# Patient Record
Sex: FEMALE | Employment: FULL TIME | URBAN - METROPOLITAN AREA
[De-identification: names, ages, dates, MRNs, and addresses within clinical notes are randomized per-mention and may not be internally consistent; named-entity substitution may affect disease eponyms.]

---

## 2018-11-01 ENCOUNTER — APPOINTMENT (OUTPATIENT)
Dept: GENERAL RADIOLOGY | Age: 58
End: 2018-11-01
Attending: EMERGENCY MEDICINE
Payer: COMMERCIAL

## 2018-11-01 ENCOUNTER — APPOINTMENT (OUTPATIENT)
Dept: CT IMAGING | Age: 58
End: 2018-11-01
Attending: EMERGENCY MEDICINE
Payer: COMMERCIAL

## 2018-11-01 ENCOUNTER — APPOINTMENT (OUTPATIENT)
Dept: MRI IMAGING | Age: 58
End: 2018-11-01
Attending: HOSPITALIST
Payer: COMMERCIAL

## 2018-11-01 ENCOUNTER — HOSPITAL ENCOUNTER (OUTPATIENT)
Age: 58
Setting detail: OBSERVATION
Discharge: HOME | End: 2018-11-02
Attending: EMERGENCY MEDICINE | Admitting: HOSPITALIST
Payer: COMMERCIAL

## 2018-11-01 DIAGNOSIS — R51.9 NONINTRACTABLE HEADACHE, UNSPECIFIED CHRONICITY PATTERN, UNSPECIFIED HEADACHE TYPE: ICD-10-CM

## 2018-11-01 DIAGNOSIS — I63.9 CEREBROVASCULAR ACCIDENT (CVA), UNSPECIFIED MECHANISM (HCC): Primary | ICD-10-CM

## 2018-11-01 DIAGNOSIS — H53.9 VISION DISTURBANCE: ICD-10-CM

## 2018-11-01 LAB
ALBUMIN SERPL-MCNC: 4 G/DL (ref 3.5–5)
ALBUMIN/GLOB SERPL: 1.3 {RATIO} (ref 1.1–2.2)
ALP SERPL-CCNC: 50 U/L (ref 45–117)
ALT SERPL-CCNC: 29 U/L (ref 12–78)
ANION GAP SERPL CALC-SCNC: 7 MMOL/L (ref 5–15)
APTT PPP: 30.5 SEC (ref 22.1–32)
AST SERPL-CCNC: 18 U/L (ref 15–37)
BASOPHILS # BLD: 0.1 K/UL (ref 0–0.1)
BASOPHILS NFR BLD: 1 % (ref 0–1)
BILIRUB SERPL-MCNC: 0.5 MG/DL (ref 0.2–1)
BUN SERPL-MCNC: 18 MG/DL (ref 6–20)
BUN/CREAT SERPL: 20 (ref 12–20)
CALCIUM SERPL-MCNC: 9.2 MG/DL (ref 8.5–10.1)
CHLORIDE SERPL-SCNC: 105 MMOL/L (ref 97–108)
CO2 SERPL-SCNC: 26 MMOL/L (ref 21–32)
COMMENT, HOLDF: NORMAL
CREAT SERPL-MCNC: 0.92 MG/DL (ref 0.55–1.02)
DIFFERENTIAL METHOD BLD: NORMAL
EOSINOPHIL # BLD: 0.1 K/UL (ref 0–0.4)
EOSINOPHIL NFR BLD: 1 % (ref 0–7)
ERYTHROCYTE [DISTWIDTH] IN BLOOD BY AUTOMATED COUNT: 13.2 % (ref 11.5–14.5)
ERYTHROCYTE [SEDIMENTATION RATE] IN BLOOD: 2 MM/HR (ref 0–30)
GLOBULIN SER CALC-MCNC: 3.1 G/DL (ref 2–4)
GLUCOSE SERPL-MCNC: 88 MG/DL (ref 65–100)
HCT VFR BLD AUTO: 45.7 % (ref 35–47)
HGB BLD-MCNC: 15.3 G/DL (ref 11.5–16)
IMM GRANULOCYTES # BLD: 0 K/UL (ref 0–0.04)
IMM GRANULOCYTES NFR BLD AUTO: 0 % (ref 0–0.5)
INR PPP: 1.1 (ref 0.9–1.1)
LYMPHOCYTES # BLD: 2.5 K/UL (ref 0.8–3.5)
LYMPHOCYTES NFR BLD: 30 % (ref 12–49)
MCH RBC QN AUTO: 30.1 PG (ref 26–34)
MCHC RBC AUTO-ENTMCNC: 33.5 G/DL (ref 30–36.5)
MCV RBC AUTO: 89.8 FL (ref 80–99)
MONOCYTES # BLD: 0.6 K/UL (ref 0–1)
MONOCYTES NFR BLD: 7 % (ref 5–13)
NEUTS SEG # BLD: 5.2 K/UL (ref 1.8–8)
NEUTS SEG NFR BLD: 61 % (ref 32–75)
NRBC # BLD: 0 K/UL (ref 0–0.01)
NRBC BLD-RTO: 0 PER 100 WBC
PLATELET # BLD AUTO: 205 K/UL (ref 150–400)
PMV BLD AUTO: 9.9 FL (ref 8.9–12.9)
POTASSIUM SERPL-SCNC: 3.5 MMOL/L (ref 3.5–5.1)
PROT SERPL-MCNC: 7.1 G/DL (ref 6.4–8.2)
PROTHROMBIN TIME: 10.8 SEC (ref 9–11.1)
RBC # BLD AUTO: 5.09 M/UL (ref 3.8–5.2)
SAMPLES BEING HELD,HOLD: NORMAL
SODIUM SERPL-SCNC: 138 MMOL/L (ref 136–145)
THERAPEUTIC RANGE,PTTT: NORMAL SECS (ref 58–77)
TROPONIN I SERPL-MCNC: <0.05 NG/ML
WBC # BLD AUTO: 8.5 K/UL (ref 3.6–11)

## 2018-11-01 PROCEDURE — 85652 RBC SED RATE AUTOMATED: CPT | Performed by: EMERGENCY MEDICINE

## 2018-11-01 PROCEDURE — 74011250636 HC RX REV CODE- 250/636: Performed by: HOSPITALIST

## 2018-11-01 PROCEDURE — 84484 ASSAY OF TROPONIN QUANT: CPT | Performed by: EMERGENCY MEDICINE

## 2018-11-01 PROCEDURE — 99218 HC RM OBSERVATION: CPT

## 2018-11-01 PROCEDURE — A9575 INJ GADOTERATE MEGLUMI 0.1ML: HCPCS | Performed by: HOSPITALIST

## 2018-11-01 PROCEDURE — 85730 THROMBOPLASTIN TIME PARTIAL: CPT | Performed by: EMERGENCY MEDICINE

## 2018-11-01 PROCEDURE — 96374 THER/PROPH/DIAG INJ IV PUSH: CPT

## 2018-11-01 PROCEDURE — 85025 COMPLETE CBC W/AUTO DIFF WBC: CPT | Performed by: EMERGENCY MEDICINE

## 2018-11-01 PROCEDURE — 80053 COMPREHEN METABOLIC PANEL: CPT | Performed by: EMERGENCY MEDICINE

## 2018-11-01 PROCEDURE — 74011250637 HC RX REV CODE- 250/637: Performed by: HOSPITALIST

## 2018-11-01 PROCEDURE — 36415 COLL VENOUS BLD VENIPUNCTURE: CPT | Performed by: EMERGENCY MEDICINE

## 2018-11-01 PROCEDURE — 99285 EMERGENCY DEPT VISIT HI MDM: CPT

## 2018-11-01 PROCEDURE — 80061 LIPID PANEL: CPT | Performed by: HOSPITALIST

## 2018-11-01 PROCEDURE — 70450 CT HEAD/BRAIN W/O DYE: CPT

## 2018-11-01 PROCEDURE — 83036 HEMOGLOBIN GLYCOSYLATED A1C: CPT | Performed by: HOSPITALIST

## 2018-11-01 PROCEDURE — 71045 X-RAY EXAM CHEST 1 VIEW: CPT

## 2018-11-01 PROCEDURE — 70553 MRI BRAIN STEM W/O & W/DYE: CPT

## 2018-11-01 PROCEDURE — 93005 ELECTROCARDIOGRAM TRACING: CPT

## 2018-11-01 PROCEDURE — 85610 PROTHROMBIN TIME: CPT | Performed by: EMERGENCY MEDICINE

## 2018-11-01 PROCEDURE — 96376 TX/PRO/DX INJ SAME DRUG ADON: CPT

## 2018-11-01 RX ORDER — BUPROPION HYDROCHLORIDE 150 MG/1
300 TABLET, EXTENDED RELEASE ORAL DAILY
Status: DISCONTINUED | OUTPATIENT
Start: 2018-11-02 | End: 2018-11-02 | Stop reason: HOSPADM

## 2018-11-01 RX ORDER — ACETAMINOPHEN 325 MG/1
650 TABLET ORAL
Status: DISCONTINUED | OUTPATIENT
Start: 2018-11-01 | End: 2018-11-02 | Stop reason: HOSPADM

## 2018-11-01 RX ORDER — LORAZEPAM 1 MG/1
1 TABLET ORAL ONCE
Status: COMPLETED | OUTPATIENT
Start: 2018-11-01 | End: 2018-11-01

## 2018-11-01 RX ORDER — GADOTERATE MEGLUMINE 376.9 MG/ML
15 INJECTION INTRAVENOUS
Status: COMPLETED | OUTPATIENT
Start: 2018-11-01 | End: 2018-11-01

## 2018-11-01 RX ORDER — LORAZEPAM 0.5 MG/1
0.5 TABLET ORAL
Status: DISCONTINUED | OUTPATIENT
Start: 2018-11-01 | End: 2018-11-02 | Stop reason: HOSPADM

## 2018-11-01 RX ORDER — ALBUTEROL SULFATE 0.83 MG/ML
2.5 SOLUTION RESPIRATORY (INHALATION)
Status: DISCONTINUED | OUTPATIENT
Start: 2018-11-01 | End: 2018-11-02 | Stop reason: HOSPADM

## 2018-11-01 RX ORDER — GUAIFENESIN 100 MG/5ML
81 LIQUID (ML) ORAL DAILY
Status: DISCONTINUED | OUTPATIENT
Start: 2018-11-02 | End: 2018-11-02 | Stop reason: HOSPADM

## 2018-11-01 RX ORDER — FAMOTIDINE 20 MG/1
20 TABLET, FILM COATED ORAL EVERY 12 HOURS
Status: DISCONTINUED | OUTPATIENT
Start: 2018-11-01 | End: 2018-11-02 | Stop reason: HOSPADM

## 2018-11-01 RX ORDER — SODIUM CHLORIDE 0.9 % (FLUSH) 0.9 %
5-10 SYRINGE (ML) INJECTION EVERY 8 HOURS
Status: DISCONTINUED | OUTPATIENT
Start: 2018-11-01 | End: 2018-11-02 | Stop reason: HOSPADM

## 2018-11-01 RX ORDER — ACETAMINOPHEN 650 MG/1
650 SUPPOSITORY RECTAL
Status: DISCONTINUED | OUTPATIENT
Start: 2018-11-01 | End: 2018-11-02 | Stop reason: HOSPADM

## 2018-11-01 RX ORDER — SODIUM CHLORIDE 0.9 % (FLUSH) 0.9 %
5-10 SYRINGE (ML) INJECTION AS NEEDED
Status: DISCONTINUED | OUTPATIENT
Start: 2018-11-01 | End: 2018-11-02 | Stop reason: HOSPADM

## 2018-11-01 RX ORDER — KETOROLAC TROMETHAMINE 30 MG/ML
30 INJECTION, SOLUTION INTRAMUSCULAR; INTRAVENOUS
Status: DISCONTINUED | OUTPATIENT
Start: 2018-11-01 | End: 2018-11-02 | Stop reason: HOSPADM

## 2018-11-01 RX ADMIN — KETOROLAC TROMETHAMINE 30 MG: 30 INJECTION, SOLUTION INTRAMUSCULAR at 20:22

## 2018-11-01 RX ADMIN — GADOTERATE MEGLUMINE 15 ML: 376.9 INJECTION INTRAVENOUS at 22:07

## 2018-11-01 RX ADMIN — LORAZEPAM 1 MG: 1 TABLET ORAL at 21:26

## 2018-11-01 NOTE — ED TRIAGE NOTES
Pt arrives from home with complaints of abnormal vision in both eyes with LEFT sided headache. Vision changes occurred last night and have no resolved. Pt is reporting \"floaters\" in field of vision.

## 2018-11-01 NOTE — ED PROVIDER NOTES
62 y.o. female with no significant past medical history who presents from work with chief complaint of vision changes. Pt states that she drove in town from [place] last night for work and once at her hotel, she noticed \"something moving around\" in her left field of vision. She states that it has been constant since that time. 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 She denies any h/o migraines or hx of similar symptoms. She reports a h/o anxiety. Pt denies any CP, SOB, or focal weakness. There are no other acute medical concerns at this time. Note written by Lico Sears, as dictated by Jamar Souza MD 6:26 PM 
 
 
The history is provided by the patient. No  was used. No past medical history on file. No past surgical history on file. No family history on file. Social History Socioeconomic History  Marital status: Not on file Spouse name: Not on file  Number of children: Not on file  Years of education: Not on file  Highest education level: Not on file Social Needs  Financial resource strain: Not on file  Food insecurity - worry: Not on file  Food insecurity - inability: Not on file  Transportation needs - medical: Not on file  Transportation needs - non-medical: Not on file Occupational History  Not on file Tobacco Use  Smoking status: Not on file Substance and Sexual Activity  Alcohol use: Not on file  Drug use: Not on file  Sexual activity: Not on file Other Topics Concern  Not on file Social History Narrative  Not on file ALLERGIES: Dilaudid [hydromorphone]; Morphine; and Reglan [metoclopramide hcl] Review of Systems Constitutional: Negative for fever. Eyes: Positive for visual disturbance. Respiratory: Negative for shortness of breath. Cardiovascular: Negative for chest pain. Gastrointestinal: Negative for abdominal pain. Neurological: Positive for headaches. All other systems reviewed and are negative. Vitals:  
 11/01/18 1823 11/01/18 1845 11/01/18 1900 11/01/18 1915 BP: 170/90 154/81 141/84 153/82 Pulse: 73 70 69 Resp: 13 18 Temp: 99.2 °F (37.3 °C) SpO2: 100% 99% 98% 98% Physical Exam  
Constitutional: She is oriented to person, place, and time. She appears well-developed and well-nourished. No distress. HENT:  
Head: Normocephalic and atraumatic. Eyes: Conjunctivae are normal. No scleral icterus. Neck: Neck supple. No tracheal deviation present. Cardiovascular: Normal rate, regular rhythm, normal heart sounds and intact distal pulses. Exam reveals no gallop and no friction rub. No murmur heard. Pulmonary/Chest: Effort normal and breath sounds normal. She has no wheezes. She has no rales. Abdominal: Soft. She exhibits no distension. There is no tenderness. There is no rebound and no guarding. Musculoskeletal: She exhibits no edema. Neurological: She is alert and oriented to person, place, and time. VAN negative. No pronator drift. No decreased  strength. No visual field cut. No aphasia. No neglect. Skin: Skin is warm and dry. No rash noted. Psychiatric: She has a normal mood and affect. Nursing note and vitals reviewed. Note written by Lico Blackman, as dictated by Dann Camacho MD 6:26 PM 
 
MDM Procedures CONSULT NOTE: 
6:28 PM Dann Camacho MD spoke with Dr. Omar Giron, Consult for Tele-Neurology. Discussed available diagnostic tests and clinical findings. Dr. Omar Giron recommends admission to r/o MS or possible stroke. Recommends getting an MRI. ED EKG interpretation: 
Rhythm: normal sinus rhythm; and irregular. Rate (approx.): 62.  
Note written by Lico Blackman, as dictated by Dann Camacho MD 6:41 PM 
 
PROGRESS NOTE: 
 7:51 PM 
Updated pt on results. She agrees with plan for admission. CONSULT NOTE: 
7:53 PM Brendan Reeder MD communicated with Dr. Jami Bashir for Hospitalist via [facility]. Discussed available diagnostic tests and clinical findings. Dr. Sanaz Franco will admit the pt. 
 
7:54 PM 
Patient is being admitted to the hospital.  The results of their tests and reasons for their admission have been discussed with them and/or available family. They convey agreement and understanding for the need to be admitted and for their admission diagnosis. Consultation will be made now with the inpatient physician for hospitalization.

## 2018-11-02 VITALS
WEIGHT: 153.44 LBS | HEIGHT: 65 IN | DIASTOLIC BLOOD PRESSURE: 81 MMHG | OXYGEN SATURATION: 100 % | BODY MASS INDEX: 25.56 KG/M2 | SYSTOLIC BLOOD PRESSURE: 144 MMHG | TEMPERATURE: 98.7 F | RESPIRATION RATE: 15 BRPM | HEART RATE: 45 BPM

## 2018-11-02 LAB
CHOLEST SERPL-MCNC: 288 MG/DL
EST. AVERAGE GLUCOSE BLD GHB EST-MCNC: 103 MG/DL
HBA1C MFR BLD: 5.2 % (ref 4.2–6.3)
HDLC SERPL-MCNC: 83 MG/DL
HDLC SERPL: 3.5 {RATIO} (ref 0–5)
LDLC SERPL CALC-MCNC: 192 MG/DL (ref 0–100)
LIPID PROFILE,FLP: ABNORMAL
TRIGL SERPL-MCNC: 65 MG/DL (ref ?–150)
VLDLC SERPL CALC-MCNC: 13 MG/DL

## 2018-11-02 PROCEDURE — 99218 HC RM OBSERVATION: CPT

## 2018-11-02 PROCEDURE — 96376 TX/PRO/DX INJ SAME DRUG ADON: CPT

## 2018-11-02 PROCEDURE — 74011250636 HC RX REV CODE- 250/636: Performed by: HOSPITALIST

## 2018-11-02 PROCEDURE — 74011250637 HC RX REV CODE- 250/637: Performed by: HOSPITALIST

## 2018-11-02 RX ORDER — GUAIFENESIN 100 MG/5ML
81 LIQUID (ML) ORAL DAILY
COMMUNITY

## 2018-11-02 RX ORDER — BUPROPION HYDROCHLORIDE 300 MG/1
300 TABLET ORAL
COMMUNITY

## 2018-11-02 RX ADMIN — KETOROLAC TROMETHAMINE 30 MG: 30 INJECTION, SOLUTION INTRAMUSCULAR at 10:24

## 2018-11-02 RX ADMIN — BUPROPION HYDROCHLORIDE 300 MG: 150 TABLET, EXTENDED RELEASE ORAL at 08:36

## 2018-11-02 RX ADMIN — ASPIRIN 81 MG CHEWABLE TABLET 81 MG: 81 TABLET CHEWABLE at 08:36

## 2018-11-02 RX ADMIN — ACETAMINOPHEN 650 MG: 325 TABLET ORAL at 04:12

## 2018-11-02 RX ADMIN — FAMOTIDINE 20 MG: 20 TABLET ORAL at 08:36

## 2018-11-02 RX ADMIN — Medication 10 ML: at 07:16

## 2018-11-02 RX ADMIN — ACETAMINOPHEN 650 MG: 325 TABLET ORAL at 08:36

## 2018-11-02 NOTE — PROGRESS NOTES
Patient being discharged today, with an eye appointment at [phone]. Patient from out of town, but okay to drive self to eye appointment per provider.

## 2018-11-02 NOTE — DISCHARGE SUMMARY
Discharge Summary     PATIENT ID: Gildardo Herzog  MRN: [id number]   YOB: 1960    DATE OF ADMISSION: 11/1/2018  6:06 PM    DATE OF DISCHARGE: 11/2/2018  PRIMARY CARE PROVIDER: Columba Eagle MD  ATTENDING PHYSICIAN: Huma Stone MD  DISCHARGING PROVIDER: Eder Celis NP. To contact this individual call [phone] and ask the  to page. If unavailable ask to be transferred the Adult Hospitalist Department. CONSULTATIONS: IP CONSULT TO HOSPITALIST  IP CONSULT TO NEUROLOGY    ADMITTING DIAGNOSES & HOSPITAL COURSE:   Pt presented to the ED with left eye vision disturbance and left sided headache. 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 This has been persistent and has been seeing \"strings\" or \"floaters\" with a headache some pressure around her left eye. + mild nausea, no vomiting and a dark room makes her feel better. Reported a hx of anxiety. Denied CP, SOB, or focal weakness, no other sensation changes,slurred speech, gait disturbance or vertigo.      DISCHARGE DIAGNOSES / PLAN:      Acute vision distubance:   - has been persistent - feels as though she sees strings in left peripheral vision + mild pressure and mild pain  - MRI negative  - Check A1c 5.2  - FLP elevated Chol 288 Ldl 192 Hdl 83.  Recommend follow up outpatient for fasting study  - Held echo or carotid doppler - these are not needed   - has outpatient appt scheduled at 1:20 with Dr. Erasto Zambrano at [facility]    Mild intermittent asthma: prn albuterol     Anxiety: continue wellbutrin     Headache: tylenol outpatient     FOLLOW UP APPOINTMENTS:    Follow-up Information     Follow up With Specialties Details Why Contact Info    Addison Ramos MD Family Practice  As needed [address] [facility], Dr. Erasto Zambrano Today 1:20 [address], [address] [facility], [address]    Other, MD Luis    Patient can only remember the practice name and not the physician           ADDITIONAL CARE RECOMMENDATIONS:   May drive to outpatient Ophthalmology appointment from hospital - additional driving recommendations as per Dr. Aida Sierra    Please follow up with your PCP for fasting lipid panel  Lipid panel 11/1 (non fasting): Chol 288 Ldl 192 Hdl 83    DIET: Cardiac Diet  ACTIVITY: Activity as tolerated    DISCHARGE MEDICATIONS:  Current Discharge Medication List      CONTINUE these medications which have NOT CHANGED    Details   buPROPion XL (WELLBUTRIN XL) 300 mg XL tablet Take 300 mg by mouth every morning. aspirin 81 mg chewable tablet Take 81 mg by mouth daily. NOTIFY YOUR PHYSICIAN FOR ANY OF THE FOLLOWING:   Fever over 101 degrees for 24 hours. Chest pain, shortness of breath, fever, chills, nausea, vomiting, diarrhea, change in mentation, falling, weakness, bleeding. Severe pain or pain not relieved by medications. Or, any other signs or symptoms that you may have questions about. DISPOSITION:    Home With:   OT  PT  HH  RN       Long term SNF/Inpatient Rehab    Independent/assisted living    Hospice   xx Other: Home     PATIENT CONDITION AT DISCHARGE:   Functional status    Poor     Deconditioned    xx Independent      Cognition   xx  Lucid     Forgetful     Dementia      Catheters/lines (plus indication)    Arita     PICC     PEG    xx None      Code status    xx Full code     DNR      PHYSICAL EXAMINATION AT DISCHARGE:  /81 (BP 1 Location: Right arm, BP Patient Position: At rest)   Pulse (!) 45   Temp 98.7 °F (37.1 °C)   Resp 15   Ht 5' 5\" (1.651 m) Comment: obtained 11/1/18  Wt 69.6 kg (153 lb 7 oz)   SpO2 100%   BMI 25.53 kg/m²     Discussed plans with pt today - she is aware MRI negative and recommending discharge to [facility] for exam. She is planning on staying overnight in [address] [facility].  She has been given clearance from us to drive to [place] but not home to [place] and that final clearance would need to come from ophthalmologist.    General: well nourished female in no acte distress  Eyes: Left pupil slightly larger than Right. + vision in left eye but blurred with some obstruction resembling strings in left eye periphery  CV: heart rate regular, no murmur  Pulm: CTA. On RA  GI: active bowel sounds  Extremities: no weakness, sensation is intact  Neuro: A/O x 3. REAGAN. No slurred speech. Psych: calm and cooperative.     CHRONIC MEDICAL DIAGNOSES:  Problem List as of 11/2/2018 Never Reviewed          Codes Class Noted - Resolved    * (Principal) Vision disturbance ICD-10-CM: H53.9  ICD-9-CM: 368.9  11/1/2018 - Present            Greater than 30 minutes were spent with the patient on counseling and coordination of care    Signed:   Kayli Gibson NP  11/2/2018  8:04 AM

## 2018-11-02 NOTE — PROGRESS NOTES
Bedside RN performed patient education and medication education. Discharge concerns initiated and discussed with patient, including clarification on \"who\" assists the patient at their home and instructions for when the home going patient should call their provider after discharge. Opportunity for questions and clarification was provided. Patient receptive to education: YES Patient stated: I'm going to stay in [facility] after the eye appointment Barriers to Education: None Diagnosis Education given:  YES Length of stay: 0 Expected Day of Discharge: 11/2/ Ask if they have \"Help at Home\" & add to white board? YES Education Day #: 2 Medication Education Given:  YES 
M in the box Medication name: Toradol Pt aware of [facility] survey: YES

## 2018-11-02 NOTE — PROGRESS NOTES
Reason for Admission:  Patient presented with left eye vision disturbance and left headache RRAT Score: 3 Plan for utilizing home health: TBD- anticipate none, no skilled nursing needs identified at this time- patient endorses being independent with mobility and ADLs prior to hospitalization Likelihood of Readmission:  Low Transition of Care Plan:  Anticipate home The CM met with patient at bedside in order to introduce the role of CM and assess for patient needs. The patient lives in [place], [place] with her - was in the [place] area for work presentations, [facility] The patient endorses being independent with ADLs and mobility- denied use of DME in the home. The patient verified demographics and insurance information. The patient denied difficulty affording or accessing medications prior to hospitalization- drove herself from her presentation to the hospital, plans to drive herself home if MD agrees. The CM will continue to follow as discharge needs arise. CASTILLO Martin Care Management Interventions PCP Verified by CM: Yes(Patient verified PCP as Dr. Adrian Esteves with [facility] in [place], [place]) Mode of Transport at Discharge: Self Transition of Care Consult (CM Consult): Discharge Planning MyChart Signup: No 
Discharge Durable Medical Equipment: No 
Health Maintenance Reviewed: Yes Physical Therapy Consult: No 
Occupational Therapy Consult: No 
Speech Therapy Consult: No 
Current Support Network: Own Home, Lives with Spouse Confirm Follow Up Transport: Self Plan discussed with Pt/Family/Caregiver: Yes [employer] Information Provided?: No 
Discharge Location Discharge Placement: Home

## 2018-11-02 NOTE — CONSULTS
Consult dictated. Patchy area of blurry vision in temporal field of left eye, associated with mild pain/HA. Likely ocular cause. Recommend ophthalmology eval/dilated eye exam. MRI brain is negative.   Justine Okeefe MD

## 2018-11-02 NOTE — ROUTINE PROCESS
TRANSFER - OUT REPORT: 
 
Verbal report given to CHONG Nazario(name) on Doreen Haider  being transferred to [facility](unit) for routine progression of care Report consisted of patients Situation, Background, Assessment and  
Recommendations(SBAR). Information from the following report(s) SBAR, ED Summary, [facility] and Recent Results was reviewed with the receiving nurse. Lines:  
Peripheral IV 11/01/18 Left Antecubital (Active) Site Assessment Clean, dry, & intact 11/1/2018  6:22 PM  
Phlebitis Assessment 0 11/1/2018  6:22 PM  
Infiltration Assessment 0 11/1/2018  6:22 PM  
Dressing Status Clean, dry, & intact 11/1/2018  6:22 PM  
  
 
Opportunity for questions and clarification was provided. Patient transported with: 
 Monitor Harvinder Jackson RN

## 2018-11-02 NOTE — H&P
History & Physical 
 
Primary Care Provider: Other, MD Luis 
Source of Information: Patient History of Presenting Illness:  
Salome Almeida is a 62 y.o. female who presents with left eye vision disturbance and left side headache  
 
62 y.o. female with no significant past medical history who came to ED with chief complaint of vision changes. Pt states that she drove in town from [place] last night for work and she was under a lot of stress and did 2 presentations today and once at her hotel this afternoon, she noticed \"something moving around\" in her left field of vision. She states that it has been constant since that time. 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 She feels mild nausea, no vomiting, dark room makes her feel better. She had some headache in the past, but never had  similar symptoms with vision disturbance. She reports a h/o anxiety. Pt denies any CP, SOB, or focal weakness no other sensation changes, no slurry speech, no gait disturbance, no vertigo. Review of Systems: 
General: sleep. Appetite ok, no fever HEENT: HPI,  no nose discharge, no hearing changes RES: no wheezing, no cough, no sob CVS: no cp, no palpitation. Muscular: no joint swelling, no muscle pain, no leg swelling Skin: no rash, no itching GI: no vomiting, no diarrhea : no dysuria, no hematuria Hemo: no gum bleeding, no petechial  
Neuro: HPI Endo: no polydipsia Psych: denied depression , stressed, No past medical history on file. MILD INTERMITTENT ASTHMA, ANXIETY, No past surgical history on file. Prior to Admission medications Not on File Allergies Allergen Reactions  Dilaudid [Hydromorphone] Nausea and Vomiting  Morphine Nausea and Vomiting  Reglan [Metoclopramide Hcl] Unknown (comments) No family history on file. SOCIAL HISTORY: 
Patient resides: 
Independently X Assisted Living SNF With family care Smoking history:  
None X Former Chronic Alcohol history:  
None Social X Chronic Ambulates:  
Independently X  
w/cane   
w/walker   
w/wc CODE STATUS: 
DNR Full X Other Objective:  
 
Physical Exam:  
 
Visit Vitals /82 Pulse 69 Temp 99.2 °F (37.3 °C) Resp 18 SpO2 98% O2 Device: Room air General:  Alert, cooperative, no distress, appears stated age. Head:  Normocephalic, without obvious abnormality, atraumatic. Eyes:  Conjunctivae/corneas clear. PERRL, EOMs intact. Nose: Nares normal. Septum midline. Mucosa normal. No drainage or sinus tenderness. Throat: Lips, mucosa, and tongue normal. Teeth and gums normal.  
Neck: Supple, symmetrical, trachea midline, no adenopathy, thyroid: no enlargement/tenderness/nodules, no carotid bruit and no JVD. Back:   Symmetric, no curvature. ROM normal. No CVA tenderness. Lungs:   Clear to auscultation bilaterally. Chest wall:  No tenderness or deformity. Heart:  Regular rate and rhythm, S1, S2 normal, no murmur, click, rub or gallop. Abdomen:   Soft, non-tender. Bowel sounds normal. No masses,  No organomegaly. Extremities: Extremities normal, atraumatic, no cyanosis or edema. Pulses: 2+ and symmetric all extremities. Skin: Skin color, texture, turgor normal. No rashes or lesions Neurologic: CNII-XII intact. Normal vision field, EOM intact, strength normal.   
 
 
 
Head ct negative Data Review:  
 
Recent Days: 
Recent Labs 11/01/18 
1825 WBC 8.5 HGB 15.3 HCT 45.7  Recent Labs 11/01/18 
1825   
K 3.5  CO2 26 GLU 88 BUN 18 CREA 0.92  
CA 9.2 ALB 4.0  
SGOT 18 ALT 29 INR 1.1 No results for input(s): PH, PCO2, PO2, HCO3, FIO2 in the last 72 hours. 24 Hour Results: 
Recent Results (from the past 24 hour(s)) CBC WITH AUTOMATED DIFF  
 Collection Time: 11/01/18  6:25 PM  
Result Value Ref Range WBC 8.5 3.6 - 11.0 K/uL  
 RBC 5.09 3.80 - 5.20 M/uL  
 HGB 15.3 11.5 - 16.0 g/dL HCT 45.7 35.0 - 47.0 % MCV 89.8 80.0 - 99.0 FL  
 MCH 30.1 26.0 - 34.0 PG  
 MCHC 33.5 30.0 - 36.5 g/dL  
 RDW 13.2 11.5 - 14.5 % PLATELET 432 359 - 105 K/uL MPV 9.9 8.9 - 12.9 FL  
 NRBC 0.0 0  WBC ABSOLUTE NRBC 0.00 0.00 - 0.01 K/uL NEUTROPHILS 61 32 - 75 % LYMPHOCYTES 30 12 - 49 % MONOCYTES 7 5 - 13 % EOSINOPHILS 1 0 - 7 % BASOPHILS 1 0 - 1 % IMMATURE GRANULOCYTES 0 0.0 - 0.5 % ABS. NEUTROPHILS 5.2 1.8 - 8.0 K/UL  
 ABS. LYMPHOCYTES 2.5 0.8 - 3.5 K/UL  
 ABS. MONOCYTES 0.6 0.0 - 1.0 K/UL  
 ABS. EOSINOPHILS 0.1 0.0 - 0.4 K/UL  
 ABS. BASOPHILS 0.1 0.0 - 0.1 K/UL  
 ABS. IMM. GRANS. 0.0 0.00 - 0.04 K/UL  
 DF AUTOMATED METABOLIC PANEL, COMPREHENSIVE Collection Time: 11/01/18  6:25 PM  
Result Value Ref Range Sodium 138 136 - 145 mmol/L Potassium 3.5 3.5 - 5.1 mmol/L Chloride 105 97 - 108 mmol/L  
 CO2 26 21 - 32 mmol/L Anion gap 7 5 - 15 mmol/L Glucose 88 65 - 100 mg/dL BUN 18 6 - 20 MG/DL Creatinine 0.92 0.55 - 1.02 MG/DL  
 BUN/Creatinine ratio 20 12 - 20 GFR est AA >60 >60 ml/min/1.73m2 GFR est non-AA >60 >60 ml/min/1.73m2 Calcium 9.2 8.5 - 10.1 MG/DL Bilirubin, total 0.5 0.2 - 1.0 MG/DL  
 ALT (SGPT) 29 12 - 78 U/L  
 AST (SGOT) 18 15 - 37 U/L Alk. phosphatase 50 45 - 117 U/L Protein, total 7.1 6.4 - 8.2 g/dL Albumin 4.0 3.5 - 5.0 g/dL Globulin 3.1 2.0 - 4.0 g/dL A-G Ratio 1.3 1.1 - 2.2 SAMPLES BEING HELD Collection Time: 11/01/18  6:25 PM  
Result Value Ref Range SAMPLES BEING HELD 1RED COMMENT Add-on orders for these samples will be processed based on acceptable specimen integrity and analyte stability, which may vary by analyte. PROTHROMBIN TIME + INR Collection Time: 11/01/18  6:25 PM  
Result Value Ref Range INR 1.1 0.9 - 1.1 Prothrombin time 10.8 9.0 - 11.1 sec PTT Collection Time: 11/01/18  6:25 PM  
Result Value Ref Range aPTT 30.5 22.1 - 32.0 sec  
 aPTT, therapeutic range     58.0 - 77.0 SECS  
TROPONIN I Collection Time: 11/01/18  6:25 PM  
Result Value Ref Range Troponin-I, Qt. <0.05 <0.05 ng/mL SED RATE (ESR) Collection Time: 11/01/18  6:25 PM  
Result Value Ref Range Sed rate, automated 2 0 - 30 mm/hr Imaging:  
 
Assessment:  
 
Active Problems: 
  Vision disturbance (11/1/2018) Plan: 1. Acute vision distubance: possible migraine headache, should r/o MS, per patient, she has 2 sisters both has MS, and 2 nephews has MS too. No stroke family history, unlikely TIA/Stroke, but will keep as observation. Order brain MRI with and without contrast. Check A1c and FLP. Hold echo or carotid doppler for now, unless MRI is concerning for stroke. 2. Mild intermittent asthma: prn albuterol 3. Anxiety: continue wellbutrin 4. Headache: prn IV Toradol Signed By: Laura Martinez MD   
 November 1, 2018

## 2018-11-02 NOTE — CONSULTS
[address] [place] [address] [address]  MR#: [id number]  : 1960  ACCOUNT #: [de-identified]   DATE OF SERVICE: 2018    REQUESTING PHYSICIAN:  Lamont Paz MD     REASON FOR EVALUATION:  Visual disturbance in the left eye and headache. 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 She was in town for presentation. She was at the hotel when she started to notice that something was floating around in the visual field on the left side. She describes this as a spider web or as if something is in her eye, but she cannot get rid of it. She also developed a headache in the periorbital area of mild to moderate severity. She has not experienced anything like this before. She has had occasional/rare migraines, but nothing like what she is experiencing now. She came into the emergency department and was admitted for further workup. Overall, her symptoms have not improved much. She does not notice any problems when she closes the left eye. No changes in speech, swallowing ability chest pain, shortness of breath, palpitations, focal motor or sensory deficits or balance problems. PAST MEDICAL HISTORY:  As mentioned above. REVIEW OF SYSTEMS:  A 10-point review of systems is negative except as mentioned in the HPI. ALLERGIES:  DILAUDID, MORPHINE AND REGLAN. SOCIAL HISTORY:  Lives independently. No history of smoking or alcohol use. 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 HOME MEDICATIONS:  None. PHYSICAL EXAMINATION:  GENERAL:  The patient is alert, fully oriented. VITAL SIGNS:  Blood pressure 127/73, temperature is 98.2, pulse is 53. NEUROLOGIC:  Speech is clear. Comprehension is normal.  Pupils are equal, round, reactive. Extraocular movements are full. No afferent pupillary defect. Visual fields appear intact, but there is an obscuration in the temporal field of the left eye. Fundus exam reveals clear disk margins. Face is symmetric. Tongue is midline. Hearing is baseline. Muscle tone and bulk is normal.  Strength is normal in both upper and lower extremities. Deep tendon reflexes are 2/2 and symmetric. Toes downgoing. Sensation intact. Gait is baseline. HEART:  Regular rate and rhythm. LUNGS:  Clear. ABDOMEN:  Soft, nontender, positive bowel sounds. EXTREMITIES:  No edema. LABORATORY DATA:  CBC, chemistry is unremarkable. ESR is  2. Lipid profile:  Triglycerides 65, HDL 83, . Hemoglobin A1c is 5.2. MRI scan of the brain with and without contrast did not show anything acute. There is minimal nonspecific white matter change. ASSESSMENT AND PLAN:  A 54-year-old female admitted with sudden onset of patchy area of blurry vision in the temporal field of the left eye associated with mild pain and headache. I suspect that this has an ocular cause. She should get ophthalmology evaluation and a dilated eye exam.  MRI of the brain is negative and she was reassured in this regard. I suggest that she should not drive until she orifices and is cleared by Ophthalmology. Please feel free to contact me with any further questions. Thank you for this consultation.       MD LAYNE Solo / BHARATH  D: 11/02/2018 09:58     T: 11/02/2018 10:16  JOB #: [id number]

## 2018-11-02 NOTE — PROGRESS NOTES
Problem: Falls - Risk of 
Goal: *Absence of Falls Document Theodora Yan Fall Risk and appropriate interventions in the flowsheet. Outcome: Progressing Towards Goal 
Fall Risk Interventions: 
  
 
  
 
Medication Interventions: Evaluate medications/consider consulting pharmacy

## 2018-11-02 NOTE — DISCHARGE INSTRUCTIONS
Discharge Instructions     PATIENT ID: Salome Almeida  MRN: [id number]   YOB: 1960    DATE OF ADMISSION: 11/1/2018  6:06 PM    DATE OF DISCHARGE: 11/2/2018  PRIMARY CARE PROVIDER: Charles Hopson MD  ATTENDING PHYSICIAN: Jerome Donovan MD  DISCHARGING PROVIDER: Kayli Gibson NP. To contact this individual call [phone] and ask the  to page. If unavailable ask to be transferred the Adult Hospitalist Department. DISCHARGE DIAGNOSES  Left Eye Visual Disturbance    CONSULTATIONS: IP CONSULT TO HOSPITALIST  IP CONSULT TO NEUROLOGY    FOLLOW UP APPOINTMENTS:   Follow-up Information     Follow up With Specialties Details Why Contact Info    Armanda Simmonds, MD Family Practice  As needed [address] [place] [address] [facility], Dr. Jj Martinez  Today 1:20 [address], [place], [address]:   May drive to outpatient Ophthalmology appointment from hospital - additional driving recommendations as per Dr. Jj Martinez    Please follow up with your PCP for fasting lipid panel  Lipid panel 11/1 (non fasting): Chol 288 Ldl 192 Hdl 83    DIET: Cardiac Diet  ACTIVITY: Activity as tolerated    · It is important that you take the medication exactly as they are prescribed. · Keep your medication in the bottles provided by the pharmacist and keep a list of the medication names, dosages, and times to be taken in your wallet. · Do not take other medications without consulting your doctor. NOTIFY YOUR PHYSICIAN FOR ANY OF THE FOLLOWING:   Fever over 101 degrees for 24 hours. Chest pain, shortness of breath, fever, chills, nausea, vomiting, diarrhea, change in mentation, falling, weakness, bleeding. Severe pain or pain not relieved by medications. Or, any other signs or symptoms that you may have questions about.     DISPOSITION:    Home With:   OT  PT  [place]  RN       SNF/Inpatient Rehab/LTAC Independent/assisted living    Hospice   xx Other: Home     CDMP Checked:   Yes *x*     PROBLEM LIST Updated:  Yes *x*     Signed:   Tamra Goodman NP  11/2/2018  11:28 AM

## 2018-11-02 NOTE — PROGRESS NOTES
Problem: Pain Goal: *Control of Pain Outcome: Progressing Towards Goal 
Patient has generalized headache. Gave tylenol and applied heat. Patient aware to call out if headache worsens or persists.

## 2018-11-02 NOTE — ROUTINE PROCESS
TRANSFER - IN REPORT: 
 
Verbal report received from [facility] (name) on Barbie Silva  being received from ED (unit) for routine progression of care Report consisted of patients Situation, Background, Assessment and  
Recommendations(SBAR). Information from the following report(s) SBAR, Kardex, ED Summary, Procedure Summary, Accordion, Recent Results and Cardiac Rhythm NSR was reviewed with the receiving nurse. Opportunity for questions and clarification was provided. Assessment completed upon patients arrival to unit and care assumed.

## 2018-11-04 LAB
ATRIAL RATE: 62 BPM
CALCULATED P AXIS, ECG09: 69 DEGREES
CALCULATED R AXIS, ECG10: 44 DEGREES
CALCULATED T AXIS, ECG11: 37 DEGREES
DIAGNOSIS, 93000: NORMAL
P-R INTERVAL, ECG05: 152 MS
Q-T INTERVAL, ECG07: 414 MS
QRS DURATION, ECG06: 88 MS
QTC CALCULATION (BEZET), ECG08: 420 MS
VENTRICULAR RATE, ECG03: 62 BPM